# Patient Record
Sex: FEMALE | Race: OTHER | HISPANIC OR LATINO | URBAN - METROPOLITAN AREA
[De-identification: names, ages, dates, MRNs, and addresses within clinical notes are randomized per-mention and may not be internally consistent; named-entity substitution may affect disease eponyms.]

---

## 2023-05-14 ENCOUNTER — EMERGENCY (EMERGENCY)
Facility: HOSPITAL | Age: 33
LOS: 1 days | Discharge: HOME | End: 2023-05-14
Attending: STUDENT IN AN ORGANIZED HEALTH CARE EDUCATION/TRAINING PROGRAM
Payer: COMMERCIAL

## 2023-05-14 VITALS
HEART RATE: 66 BPM | TEMPERATURE: 98 F | RESPIRATION RATE: 19 BRPM | SYSTOLIC BLOOD PRESSURE: 105 MMHG | DIASTOLIC BLOOD PRESSURE: 68 MMHG | OXYGEN SATURATION: 98 %

## 2023-05-14 VITALS
DIASTOLIC BLOOD PRESSURE: 87 MMHG | SYSTOLIC BLOOD PRESSURE: 142 MMHG | HEIGHT: 64 IN | HEART RATE: 68 BPM | TEMPERATURE: 98 F | WEIGHT: 218.04 LBS | OXYGEN SATURATION: 98 % | RESPIRATION RATE: 20 BRPM

## 2023-05-14 PROCEDURE — 99284 EMERGENCY DEPT VISIT MOD MDM: CPT

## 2023-05-14 PROCEDURE — 99283 EMERGENCY DEPT VISIT LOW MDM: CPT

## 2023-05-14 RX ORDER — IBUPROFEN 200 MG
600 TABLET ORAL ONCE
Refills: 0 | Status: COMPLETED | OUTPATIENT
Start: 2023-05-14 | End: 2023-05-14

## 2023-05-14 RX ORDER — LIDOCAINE 4 G/100G
1 CREAM TOPICAL ONCE
Refills: 0 | Status: COMPLETED | OUTPATIENT
Start: 2023-05-14 | End: 2023-05-14

## 2023-05-14 RX ORDER — ACETAMINOPHEN 500 MG
975 TABLET ORAL ONCE
Refills: 0 | Status: COMPLETED | OUTPATIENT
Start: 2023-05-14 | End: 2023-05-14

## 2023-05-14 RX ORDER — DIAZEPAM 5 MG
5 TABLET ORAL ONCE
Refills: 0 | Status: DISCONTINUED | OUTPATIENT
Start: 2023-05-14 | End: 2023-05-14

## 2023-05-14 RX ADMIN — Medication 600 MILLIGRAM(S): at 13:22

## 2023-05-14 RX ADMIN — Medication 5 MILLIGRAM(S): at 13:13

## 2023-05-14 RX ADMIN — LIDOCAINE 1 PATCH: 4 CREAM TOPICAL at 13:14

## 2023-05-14 RX ADMIN — Medication 975 MILLIGRAM(S): at 13:15

## 2023-05-14 NOTE — ED PROVIDER NOTE - ATTENDING APP SHARED VISIT CONTRIBUTION OF CARE
I, Tyrone Tucker, performed a history and physical exam of the patient and discussed their management with the resident and/or advanced care provider. I reviewed the resident and/or advanced care provider's note and agree with the documented findings and plan of care except where noted. I was present and available for all procedures.    see mdm

## 2023-05-14 NOTE — ED ADULT TRIAGE NOTE - CHIEF COMPLAINT QUOTE
lower back pain after lifting something heavy yesterday, no leg numbness/ tingling/ no urinary/stool incontinence, took muscle relaxer this AM no relief

## 2023-05-14 NOTE — ED PROVIDER NOTE - PHYSICAL EXAMINATION
MSK: no spinal or paraspinal tenderness. able to elevated b/l legs. no ttp to b/l legs with some discomfort. no swelling, warmth or erythema present. strength 5/5 both upper and lower extremities.   neck: supple.

## 2023-05-14 NOTE — ED PROVIDER NOTE - CLINICAL SUMMARY MEDICAL DECISION MAKING FREE TEXT BOX
Tyrone Tucker MD. 31 yo F no PMHx except for known lumbar herniated discs, s/p cholecystectomy, presents to the ed c/o right sided low back pain x1 day after working out yesterday. pt's pain was so severe (R>L) that her legs felt weak today but pt is able to ambulate. attempted Robaxin at home w/ mild relief. denies bowel or urinary incontinence. denies urinary retention. denies fever, saddle anesthesia, numbness, tingling, abd pain, dysuria, hematuria.     awake, alert. neurologically intact, no focal deficits. A&ox3. rrr nl s1/s2, no m/r/g. lungs cta. abd soft nt nd. b/l le no edema or ttp. no midline vertebral/lumbar ttp. right lumbar ttp with muscle spasm. +SLR of RLE. neuro intact, no focal neuro deficits. no rash.    mdm pt presenting like with muslce spasm vs sciatica. low suspicion for cord compression or cauda equina. will give po meds, reassess. anticipate dc home with outpt spine f/u

## 2023-05-14 NOTE — ED PROVIDER NOTE - NS ED ATTENDING STATEMENT MOD
This was a shared visit with the JE. I reviewed and verified the documentation and independently performed the documented:

## 2023-05-14 NOTE — ED PROVIDER NOTE - NSFOLLOWUPINSTRUCTIONS_ED_ALL_ED_FT
take Tylenol or Motrin as needed for pain  you can apply a lidocaine patch to the area every 12 hours. after applying for 12 hour period, remove the patch and do not reapply for another 12 hours.     you can take Robaxin as needed. you can take 750-1500mg every 8 hours for 2-3 days. do not operate heavy machinery, drink or drive while taking Robaxin as it causes drowsiness.     Back Pain    Back pain is very common in adults. The cause of back pain is rarely dangerous and the pain often gets better over time. The cause of your back pain may not be known and may include strain of muscles or ligaments, degeneration of the spinal disks, or arthritis. Occasionally the pain may radiate down your leg(s). Over-the-counter medicines to reduce pain and inflammation are often the most helpful. Stretching and remaining active frequently helps the healing process.     SEEK IMMEDIATE MEDICAL CARE IF YOU HAVE ANY OF THE FOLLOWING SYMPTOMS: bowel or bladder control problems, unusual weakness or numbness in your arms or legs, nausea or vomiting, abdominal pain, fever, dizziness/lightheadedness.

## 2023-05-14 NOTE — ED PROVIDER NOTE - OBJECTIVE STATEMENT
31 y/o female, denies pmh, presents to the ED with complaint of lower back pain. states she was working out yesterday and states felt lower back pain and soreness after working out. states took ibuprofen after and felt better. states this morning woke up and pain was present. states located to lumbar region and radiates down b/l legs. states took Robaxin this morning with minimal relief. denies bowel or bladder incontinence. denies numbness or tingling to extremities. denies HA, dizziness, urinary symptoms, abdominal pain, chest pain, SOB. denies fall.

## 2023-05-14 NOTE — ED PROVIDER NOTE - NSFOLLOWUPCLINICS_GEN_ALL_ED_FT
[facility] Orthopedic Surgery  Orthopedic Surgery  [address], [address]rd & [address]th [place] [address]  Phone: [phone]  Fax:   Follow Up Time: 1-3 Days

## 2023-05-14 NOTE — ED PROVIDER NOTE - PROGRESS NOTE DETAILS
Bk Llamas PA-C: patient reports improvement of symptoms. advised to take Tylenol and/or Motrin for pain as needed. advised to use lidocaine patches as well. states has Robaxin at home. advised on proper dose 750mg-1500mg every 8 hours for 2-3 days. advised not to drink, drive or operate heavy machinery while taking Robaxin. patient understands and is in agreement with plan. well appearing, stable for discharge. discussed with Dr. Tuckre.

## 2023-05-14 NOTE — ED PROVIDER NOTE - PATIENT PORTAL LINK FT
You can access the FollowMyHealth Patient Portal offered by [facility] by registering at the following website: [email or web address]. By joining [facility]’s FollowMyHealth portal, you will also be able to view your health information using other applications (apps) compatible with our system.

## 2023-05-14 NOTE — ED ADULT NURSE NOTE - NSFALLUNIVINTERV_ED_ALL_ED
Bed/Stretcher in lowest position, wheels locked, appropriate side rails in place/Call bell, personal items and telephone in reach/Instruct patient to call for assistance before getting out of bed/chair/stretcher/Non-slip footwear applied when patient is off stretcher/[place] to call system/Physically safe environment - no spills, clutter or unnecessary equipment/Purposeful proactive rounding/Room/bathroom lighting operational, light cord in reach

## 2023-05-15 ENCOUNTER — EMERGENCY (EMERGENCY)
Facility: HOSPITAL | Age: 33
LOS: 1 days | Discharge: HOME | End: 2023-05-15
Attending: EMERGENCY MEDICINE
Payer: COMMERCIAL

## 2023-05-15 VITALS
HEART RATE: 59 BPM | DIASTOLIC BLOOD PRESSURE: 85 MMHG | WEIGHT: 218.04 LBS | TEMPERATURE: 98 F | RESPIRATION RATE: 18 BRPM | SYSTOLIC BLOOD PRESSURE: 125 MMHG | OXYGEN SATURATION: 98 % | HEIGHT: 64 IN

## 2023-05-15 VITALS
TEMPERATURE: 98 F | OXYGEN SATURATION: 97 % | SYSTOLIC BLOOD PRESSURE: 101 MMHG | DIASTOLIC BLOOD PRESSURE: 64 MMHG | HEART RATE: 92 BPM | RESPIRATION RATE: 18 BRPM

## 2023-05-15 LAB
APPEARANCE UR: ABNORMAL
BACTERIA # UR AUTO: ABNORMAL
BILIRUB UR-MCNC: NEGATIVE — SIGNIFICANT CHANGE UP
COLOR SPEC: SIGNIFICANT CHANGE UP
DIFF PNL FLD: NEGATIVE — SIGNIFICANT CHANGE UP
EPI CELLS # UR: 5 /HPF — SIGNIFICANT CHANGE UP
GLUCOSE UR QL: NEGATIVE — SIGNIFICANT CHANGE UP
HCG UR QL: NEGATIVE — SIGNIFICANT CHANGE UP
HYALINE CASTS # UR AUTO: 1 /LPF — SIGNIFICANT CHANGE UP (ref 0–7)
KETONES UR-MCNC: NEGATIVE — SIGNIFICANT CHANGE UP
LEUKOCYTE ESTERASE UR-ACNC: NEGATIVE — SIGNIFICANT CHANGE UP
NITRITE UR-MCNC: NEGATIVE — SIGNIFICANT CHANGE UP
PH UR: 5.5 — SIGNIFICANT CHANGE UP (ref 5–8)
PROT UR-MCNC: NEGATIVE — SIGNIFICANT CHANGE UP
RBC CASTS # UR COMP ASSIST: 4 /HPF — SIGNIFICANT CHANGE UP (ref 0–4)
SP GR SPEC: 1.02 — SIGNIFICANT CHANGE UP (ref 1.01–1.02)
UROBILINOGEN FLD QL: NEGATIVE — SIGNIFICANT CHANGE UP
WBC UR QL: 42 /HPF — HIGH (ref 0–5)

## 2023-05-15 PROCEDURE — 99284 EMERGENCY DEPT VISIT MOD MDM: CPT

## 2023-05-15 PROCEDURE — 99284 EMERGENCY DEPT VISIT MOD MDM: CPT | Mod: 25

## 2023-05-15 PROCEDURE — 81001 URINALYSIS AUTO W/SCOPE: CPT

## 2023-05-15 PROCEDURE — 96374 THER/PROPH/DIAG INJ IV PUSH: CPT

## 2023-05-15 PROCEDURE — 96372 THER/PROPH/DIAG INJ SC/IM: CPT | Mod: XU

## 2023-05-15 PROCEDURE — 81025 URINE PREGNANCY TEST: CPT

## 2023-05-15 RX ORDER — MORPHINE SULFATE 50 MG/1
4 CAPSULE, EXTENDED RELEASE ORAL ONCE
Refills: 0 | Status: DISCONTINUED | OUTPATIENT
Start: 2023-05-15 | End: 2023-05-15

## 2023-05-15 RX ORDER — OXYCODONE HYDROCHLORIDE 5 MG/1
1 TABLET ORAL
Qty: 12 | Refills: 0
Start: 2023-05-15 | End: 2023-05-18

## 2023-05-15 RX ORDER — OXYCODONE HYDROCHLORIDE 5 MG/1
10 TABLET ORAL ONCE
Refills: 0 | Status: DISCONTINUED | OUTPATIENT
Start: 2023-05-15 | End: 2023-05-15

## 2023-05-15 RX ORDER — KETOROLAC TROMETHAMINE 30 MG/ML
30 SYRINGE (ML) INJECTION ONCE
Refills: 0 | Status: DISCONTINUED | OUTPATIENT
Start: 2023-05-15 | End: 2023-05-15

## 2023-05-15 RX ORDER — DIAZEPAM 5 MG
5 TABLET ORAL ONCE
Refills: 0 | Status: DISCONTINUED | OUTPATIENT
Start: 2023-05-15 | End: 2023-05-15

## 2023-05-15 RX ORDER — ACETAMINOPHEN 500 MG
975 TABLET ORAL ONCE
Refills: 0 | Status: COMPLETED | OUTPATIENT
Start: 2023-05-15 | End: 2023-05-15

## 2023-05-15 RX ORDER — LIDOCAINE 4 G/100G
1 CREAM TOPICAL ONCE
Refills: 0 | Status: COMPLETED | OUTPATIENT
Start: 2023-05-15 | End: 2023-05-15

## 2023-05-15 RX ADMIN — OXYCODONE HYDROCHLORIDE 10 MILLIGRAM(S): 5 TABLET ORAL at 08:15

## 2023-05-15 RX ADMIN — OXYCODONE HYDROCHLORIDE 10 MILLIGRAM(S): 5 TABLET ORAL at 07:46

## 2023-05-15 RX ADMIN — Medication 30 MILLIGRAM(S): at 08:51

## 2023-05-15 RX ADMIN — Medication 975 MILLIGRAM(S): at 08:51

## 2023-05-15 RX ADMIN — Medication 5 MILLIGRAM(S): at 07:46

## 2023-05-15 RX ADMIN — LIDOCAINE 1 PATCH: 4 CREAM TOPICAL at 07:45

## 2023-05-15 RX ADMIN — Medication 975 MILLIGRAM(S): at 07:46

## 2023-05-15 RX ADMIN — MORPHINE SULFATE 4 MILLIGRAM(S): 50 CAPSULE, EXTENDED RELEASE ORAL at 08:48

## 2023-05-15 RX ADMIN — Medication 30 MILLIGRAM(S): at 07:47

## 2023-05-15 RX ADMIN — MORPHINE SULFATE 4 MILLIGRAM(S): 50 CAPSULE, EXTENDED RELEASE ORAL at 09:16

## 2023-05-15 NOTE — ED PROVIDER NOTE - ATTENDING CONTRIBUTION TO CARE
Dr. Riojas (Attending Physician)  I performed a history and physical exam of the patient and discussed their management with the resident. I reviewed the resident's note and agree with the documented findings and plan of care. My medical decision making and observations are found above.

## 2023-05-15 NOTE — ED ADULT NURSE NOTE - OBJECTIVE STATEMENT
Patient c/o right lower back pain x 3 days. Patient states she injured back on saturday at PT and was seen in ED yesterday because she was unable to walk due to the pain. Patient states that she has been taking medication for pain as instructed by MD but with no relief and states she is still unable to walk. Last pain med taken at 1am. Denies numbness, tingling, abdominal pain, sob, n/v. Patient A&Ox4. ID band and safety precautions in place. Plan of care in progress.

## 2023-05-15 NOTE — ED PROVIDER NOTE - NSFOLLOWUPCLINICS_GEN_ALL_ED_FT
[facility] Orthopedic Surgery  Orthopedic Surgery  [address], [address]rd & 4th [place] [address]  Phone: [phone]  Fax:

## 2023-05-15 NOTE — ED ADULT NURSE NOTE - CHPI ED NUR SYMPTOMS NEG
no anorexia/no bladder dysfunction/no bowel dysfunction/no constipation/no motor function loss/no neck tenderness/no numbness/no tingling

## 2023-05-15 NOTE — ED PROVIDER NOTE - PROGRESS NOTE DETAILS
Patient was re-evaluated and doing well. Results, including any incidental findings, were discussed. Return precautions and follow up were discussed. Patient verbalized understanding.  pt feels better and able to ambulate

## 2023-05-15 NOTE — ED PROVIDER NOTE - PATIENT PORTAL LINK FT
You can access the FollowMyHealth Patient Portal offered by [facility] by registering at the following website: [email or web address]. By joining [employer]’s FollowMyHealth portal, you will also be able to view your health information using other applications (apps) compatible with our system.

## 2023-05-15 NOTE — ED PROVIDER NOTE - CLINICAL SUMMARY MEDICAL DECISION MAKING FREE TEXT BOX
Dr. Riojas (Attending Physician)  32-year-old female with past medical history of lumbar herniated disks L3-S1 presenting with lumbar back pain worse on the right 2 days, was seen here yesterday, and given ibuprofen and Tylenol Robaxin and lidocaine patch.  Patient woke up from sleep at 1:30 AM with severe pain and difficulty moving.  Had to call EMS to come to ED.  She last took ibuprofen at 9:00 last night, Tylenol Robaxin.  At 1:30 AM.  On exam she has paraspinal tenderness in the right lumbar area she does not have CVA tenderness.  She has a positive straight leg raise bilaterally.  She has normal strength and sensation lower extremities.  And her abdomen is soft nontender nondistended.  Differential diagnosis includes lumbar strain, herniated disc, sciatica.  She denies urinary retention or perineal paresthesias or incontinence of stool or urine, so very low suspicion for cauda equina.  We will give ibuprofen Tylenol and Valium and oxycodone, check UA and urine pregnancy.  Will likely need dc with opioids and outpatient follow-up with spine and physical therapy.

## 2023-05-15 NOTE — ED ADULT NURSE REASSESSMENT NOTE - NS ED NURSE REASSESS COMMENT FT1
Patient reports feeling much better than earlier today. Still reports moderate pain but states that the medications provided some pain relief. No signs of acute distress at this time.

## 2023-05-15 NOTE — ED ADULT NURSE NOTE - NSFALLRISKINTERV_ED_ALL_ED
Assistance OOB with selected safe patient handling equipment if applicable/Assistance with ambulation/Communicate fall risk and risk factors to all staff, patient, and family/Monitor gait and stability/Provide visual cue: yellow wristband, yellow gown, etc/Reinforce activity limits and safety measures with patient and family/Call bell, personal items and telephone in reach/Instruct patient to call for assistance before getting out of bed/chair/stretcher/Non-slip footwear applied when patient is off stretcher/[place] to call system/Physically safe environment - no spills, clutter or unnecessary equipment/Purposeful Proactive Rounding/Room/bathroom lighting operational, light cord in reach

## 2023-05-15 NOTE — ED PROVIDER NOTE - NSFOLLOWUPINSTRUCTIONS_ED_ALL_ED_FT
You were seen in the ED for back pain.    This is likely due to your known herniated discs.    For your back pain, continue to take all prior medications as prescribed.    In addition, you may take 5mg oxycodone every 8 hours.    Please follow up with your PCP in 2-3 days.     The hospital will call you to help arrange spine/orthopedic follow up.    Return to the ED if you experience any worsening or new symptoms or any symptoms that concern you, including fevers, chills, shortness of breath, chest pain, weakness, numbness, inability to walk, worsening pain, urinary or fecal incontinence or retention, numbness in your legs.

## 2023-10-12 NOTE — ED ADULT NURSE NOTE - NS ED NURSE RECORD ANOTHER VITAL SIGN
LVM for patient to call back to schedule PROC- b/l SUPRASCAPULAR NB UNDER ULTRASOUND with Dr. Mcnally on Tues AM or Thurs AM   Yes

## 2025-04-22 NOTE — ED ADULT NURSE NOTE - OBJECTIVE STATEMENT
"The RN called and spoke with the patient regarding symptoms. The patient stated that two weeks ago she had an episode of lightheadedness and at that time \"felt something in my chest\" which the patient described as a \"discomfort\", although the patient denied having radiating pain or heart racing. The patient stated that the episode of chest discomfort two weeks ago resolved spontaneously and the patient denied having recurrent episodes. The patient stated that the episode of lightheadedness two weeks ago also resolved spontaneously. The patient stated that she did not have recurrent lightheadedness up until this AM. The patient stated that she had just gotten out of bed this AM, walked to her dresser, was standing there, and \"all of a sudden got lightheaded.\" With regards to the lightheadedness, the patient stated that she felt \"better now\" and denied feeling lightheaded at the time of the RN's phone call. The patient denied having passed out, fallen, or hit her head this AM. The patient also shared that she's felt that \"my head doesn't feel quite right\" which the patient further described as that it, \"feels like I'm in slow motion.\" Over the past 2 weeks the patient also described having episodes back pain that go into her left hip. These episodes are not constant and the episodes seem to be worse with movement. The patient reported also having left hip pain which the patient described as sometimes \"stabbing.\" Additionally, the patient reported an \"uncomfortable\" pain wrapping around to the left side/flank, although the patient denied abdominal pain. The patient stated that using a heating pad helps with the back pain, but shared that she feels \"stiff\" upon standing after periods of sitting. The patient stated that she had 1 episode of back pain today. The patient reported feeling tired and fatigued. The patient expressed that she felt like her recent pain/symptoms have interfered with sleep/kept her awake the last " "1-2 nights and described waking up several times over the last 1-2 nights (and sleeping later than usual this morning). The patient reported a history of CKD (pt denied recent NSAID use) and was unsure of recent pain was kidney pain. Additionally, the patient expressed concern for recent frequent urination and dry mouth this AM. When prompted about urine color/changes, the patient stated that her urine is \"kind of green-jessica\" but has been, \"going on a long time.\" The patient denied fevers, chills, dysuria, bloody urine, trouble breathing, or other recent symptoms. The patient denied recent falls. The RN advised the patient that the patient should seek care at Urgent Care today for evaluation of symptoms (given history of CKD and given that there are no appointments at the [facility] today or tomorrow). The patient verbalized understanding of the plan/the RN's recommendations and stated that she would go to the Urgent Care location on [facility] (RN educated the patient that she could seek care at any Urgent Care location). The RN also encouraged the patient to call the [facility] after she seeks care at Urgent Care to schedule an appt to follow up after the recent Urgent Care visit and the patient verbalized understanding of the plan.    " 31 y/o female, denies pmh, presents to the ED with complaint of lower back pain. states she was working out yesterday and states felt lower back pain and soreness after working out. states took ibuprofen after and felt better. states this morning woke up and pain was present. states located to lumbar region and radiates down b/l legs. Seen and evaluated by ED resident, pain meds given as ordered and tolerated well.